# Patient Record
Sex: FEMALE | Race: WHITE
[De-identification: names, ages, dates, MRNs, and addresses within clinical notes are randomized per-mention and may not be internally consistent; named-entity substitution may affect disease eponyms.]

---

## 2018-04-04 ENCOUNTER — HOSPITAL ENCOUNTER (EMERGENCY)
Dept: HOSPITAL 45 - C.EDB | Age: 23
Discharge: HOME | End: 2018-04-04
Payer: COMMERCIAL

## 2018-04-04 VITALS
BODY MASS INDEX: 24.32 KG/M2 | HEIGHT: 67.99 IN | WEIGHT: 160.5 LBS | BODY MASS INDEX: 24.32 KG/M2 | WEIGHT: 160.5 LBS | HEIGHT: 67.99 IN

## 2018-04-04 VITALS — OXYGEN SATURATION: 95 % | DIASTOLIC BLOOD PRESSURE: 80 MMHG | SYSTOLIC BLOOD PRESSURE: 125 MMHG | HEART RATE: 70 BPM

## 2018-04-04 VITALS — TEMPERATURE: 98.06 F

## 2018-04-04 DIAGNOSIS — F12.90: ICD-10-CM

## 2018-04-04 DIAGNOSIS — F32.9: ICD-10-CM

## 2018-04-04 DIAGNOSIS — F41.9: ICD-10-CM

## 2018-04-04 DIAGNOSIS — F17.200: ICD-10-CM

## 2018-04-04 DIAGNOSIS — S61.321A: Primary | ICD-10-CM
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
## 2018-04-04 NOTE — EMERGENCY ROOM VISIT NOTE
ED Visit Note


First contact with patient:  21:17


CHIEF COMPLAINT: Finger laceration





HISTORY OF PRESENT ILLNESS: This 22-year-old female patient presents to the 

emergency department by private vehicle after cutting the left second finger 

around 1:15 PM today with a utility knife. The bleeding has not stopped, which 

is why she has come into the ER.  Denies weakness or numbness of the finger. 

The patient has full range of motion of the fingers. The patient rates the pain 

as burning and 1/10.  The patient denies any other injuries. The patient's 

tetanus shot is up to date.  She is right-hand dominant.





REVIEW OF SYSTEMS: A 6 system review of systems was completed with positives 

and pertinent negatives listed in the HPI. 





ALLERGIES: No known allergies.





MEDICATIONS: Reviewed in chart.





PMH: Depression, anxiety.





SOCIAL HISTORY: Lives at home.  [employer].  Current smoker, denies 

alcohol and recreational drugs.





PHYSICAL EXAM: Vital Signs: Reviewed Nurse's notes, vital signs stable. GENERAL

: Pleasant and cooperative, in no acute distress, well developed, well 

nourished.  SKIN:  There is a 1 cm long laceration on the dorsal aspect of the 

left second finger overlying the PIP joint. The edges gape apart with traction. 

There is no foreign material in the wound and it looks clean. There is minimal 

bleeding. No deep structures such as tendons, bones, or significant blood 

vessels are seen in the base of the wound. Extension and flexion of the finger 

is full and strong. Full range of motion of the wrist and other fingers. 

Capillary refill less than 2 seconds. Normal sensation to light and sharp touch.





EMERGENCY DEPARTMENT COURSE: I examined the patient.  Verbal consent was 

obtained to perform the procedure.  Using sterile technique the wound was 

cleansed with Betadine.  3 ml of 1% buffered lidocaine was used to perform a 

digital block to anesthetize the patient.  The area was sterilely draped.  Once 

the patient was anesthetized, the wound was copiously irrigated under pressure 

with sterile saline.  The wound was explored and there were no deep structures 

injured.  The laceration was repaired using 3 simple interrupted 5-0 nylon 

sutures.  The patient tolerated the procedure well.  Hemostasis was achieved.  

The area was cleaned with sterile saline and dressed with bacitracin ointment 

and bandage.  The patient was placed in a metal finger splint under my 

supervision, she remained neurovascularly intact post splint.  Patient was 

educated regarding wound care, follow-up, and return precautions, she 

verbalized understanding.  The patient was discharged home in good condition.


Current/Historical Medications


Scheduled


Birth Control Pills (Birth Control Pills), 1 TAB PO DAILY


Lithium Carbonate (Lithium Carbonate), 300 MG PO DAILY


Sertraline Hcl (Zoloft), 100 MG PO DAILY





Scheduled PRN


Alprazolam (Xanax), 10 MG PO DAILY PRN for Anxiety





Allergies


Coded Allergies:  


     No Known Allergies (Unverified , 4/4/18)





Vital Signs











  Date Time  Temp Pulse Resp B/P (MAP) Pulse Ox O2 Delivery O2 Flow Rate FiO2


 


4/4/18 22:48  70 18 125/80 95   


 


4/4/18 21:13 36.7 77 18 137/99 95 Room Air  











Departure Information


Impression





 Primary Impression:  


 Laceration of finger





Dispostion


Home / Self-Care





Condition


GOOD





Referrals


No Doctor, Assigned (PCP)








[facility]





Patient Instructions


ED Laceration Hand, [facility]





Additional Instructions





Follow-up with your PCP, in urgent care, or ER for suture removal in 8-10 days.

  Wear the finger splint until your sutures are removed.





Keep wound clean and dry.  Do not allow any crusting or dried blood to 

accumulate on sutures.  If this occurs, use a 1:1 solution of hydrogen peroxide/

water on a Q-tip to clean the wound.  Do not submerge the wound under water 

until the sutures have been removed.





Use an antibiotic ointment for 3-4 days, then let wound dry.  Keep covered with 

a Band-Aid.





Ice and elevate for swelling and pain.  Ibuprofen 600 mg and Tylenol 1000 mg 

every 6-8 hours as needed for pain.  





As with all lacerations, there may be temporary or permanent nerve damage or 

scarring.





Keep covered when in sun until sutures removed then SPF 50 or higher for one 

year.  Vitamin E oil if desired two weeks after suture removal for reduction of 

scar.  





Please seek immediate medical attention for any signs of infection (increasing 

redness, pain, swelling, pus drainage, streaking up the hand, fever/chills), or 

new onset of numbness in the finger.





Problem Qualifiers








 Primary Impression:  


 Laceration of finger


 Encounter type:  initial encounter  Finger:  index finger  Damage to nail 

status:  with damage  Foreign body presence:  with foreign body  Laterality:  

left  Qualified Codes:  S61.321A - Laceration with foreign body of left index 

finger with damage to nail, initial encounter